# Patient Record
Sex: FEMALE | HISPANIC OR LATINO
[De-identification: names, ages, dates, MRNs, and addresses within clinical notes are randomized per-mention and may not be internally consistent; named-entity substitution may affect disease eponyms.]

---

## 2019-03-06 ENCOUNTER — APPOINTMENT (OUTPATIENT)
Dept: PULMONOLOGY | Facility: CLINIC | Age: 49
End: 2019-03-06
Payer: COMMERCIAL

## 2019-03-06 VITALS
BODY MASS INDEX: 33.23 KG/M2 | OXYGEN SATURATION: 97 % | WEIGHT: 176 LBS | DIASTOLIC BLOOD PRESSURE: 80 MMHG | RESPIRATION RATE: 25 BRPM | HEART RATE: 110 BPM | SYSTOLIC BLOOD PRESSURE: 130 MMHG | HEIGHT: 61 IN

## 2019-03-06 DIAGNOSIS — Z78.9 OTHER SPECIFIED HEALTH STATUS: ICD-10-CM

## 2019-03-06 DIAGNOSIS — Z86.79 PERSONAL HISTORY OF OTHER DISEASES OF THE CIRCULATORY SYSTEM: ICD-10-CM

## 2019-03-06 PROCEDURE — 99205 OFFICE O/P NEW HI 60 MIN: CPT | Mod: 25

## 2019-03-06 PROCEDURE — 94010 BREATHING CAPACITY TEST: CPT

## 2019-03-06 NOTE — PHYSICAL EXAM
[General Appearance - Well Developed] : well developed [Normal Appearance] : normal appearance [Well Groomed] : well groomed [General Appearance - Well Nourished] : well nourished [No Deformities] : no deformities [General Appearance - In No Acute Distress] : no acute distress [Normal Conjunctiva] : the conjunctiva exhibited no abnormalities [Eyelids - No Xanthelasma] : the eyelids demonstrated no xanthelasmas [Normal Oropharynx] : normal oropharynx [Neck Appearance] : the appearance of the neck was normal [Neck Cervical Mass (___cm)] : no neck mass was observed [Jugular Venous Distention Increased] : there was no jugular-venous distention [Thyroid Diffuse Enlargement] : the thyroid was not enlarged [Thyroid Nodule] : there were no palpable thyroid nodules [Respiration, Rhythm And Depth] : normal respiratory rhythm and effort [Exaggerated Use Of Accessory Muscles For Inspiration] : no accessory muscle use [Auscultation Breath Sounds / Voice Sounds] : lungs were clear to auscultation bilaterally [Abdomen Soft] : soft [Abdomen Tenderness] : non-tender [Abdomen Mass (___ Cm)] : no abdominal mass palpated [Abnormal Walk] : normal gait [Gait - Sufficient For Exercise Testing] : the gait was sufficient for exercise testing [Nail Clubbing] : no clubbing of the fingernails [Cyanosis, Localized] : no localized cyanosis [Petechial Hemorrhages (___cm)] : no petechial hemorrhages [Skin Color & Pigmentation] : normal skin color and pigmentation [Skin Turgor] : normal skin turgor [] : no rash [Deep Tendon Reflexes (DTR)] : deep tendon reflexes were 2+ and symmetric [Sensation] : the sensory exam was normal to light touch and pinprick [No Focal Deficits] : no focal deficits [Oriented To Time, Place, And Person] : oriented to person, place, and time [Impaired Insight] : insight and judgment were intact [Affect] : the affect was normal

## 2019-03-06 NOTE — ASSESSMENT
[FreeTextEntry1] : The patient is a very pleasant 48-year-old lady with rheumatoid arthritis on hydroxychloroquine and with a history of hypertension\par She gives a fairly good history of being exposed to a number of people with last upper respiratory infections and then coming down with similar symptoms and fever herself\par She was told that she had pneumonia when she went to the hospital after a syncopal attack treated with antibiotics\par A repeat chest x-ray done on February 28 confirms patchy infiltrates especially on the right\par I reviewed the films along with her and her \par \par Her physical exam is fairly unremarkable at this time\par \par I am recommending no further antibiotics I don't believe she needs oral corticosteroids\par I am suggesting a convalescent chest x-ray to be done in mid April and I would like to see her again after that has been accomplished\par \par If the patient has persistent infiltrates or symptoms, and then further investigation will be considered in view of her history of rheumatoid arthritis. Nevertheless this is most consistent with an acute illness and I am expecting a full improvement.\par \par In the absence of significant airways obstruction, I am not sure whether she will need her Symbicort for the long-term. However I have instructed her to utilize it twice daily for the next 3-4 weeks and if she improves in a significant amount she may discontinue this

## 2019-03-06 NOTE — HISTORY OF PRESENT ILLNESS
[FreeTextEntry1] : The patient is a 48-year-old lady referred by Dr. Glaser\par \par The patient has about a week and a half history of cough congestion originally presenting with a syncopal episode at 2 AM at home. She had had a day or 2 of fever prior. The patient was brought to the [place] emergency department where she was rehydrated and then discharged. She was told that she had pneumonia she was started on antibiotics\par \par The patient notes that during her family members illness many members of her family apparently had upper respiratory infections over the several weeks prior to this. She started coughing and was congested and she had fever the 2 days prior to her syncopal episode\par \par After going to the emergency department on February 22, she went to her primary doctor on February 28, was sent for a chest x-ray which showed bilateral patchy infiltrates, she was given medication including azithromycin and Ceftin.   She also was given Symbicort as well as cough medication\par \par The patient has underlying rheumatoid arthritis for which she takes  hydroxychloroquine\par \par The patient has hypertension for which he takes losartan and hydrochlorothiazide\par \par The patient is a nonsmoker. She works for [employer]. She is the mother of triplets who are 15. She has no history of asthma in the distant past

## 2019-03-06 NOTE — PROCEDURE
[FreeTextEntry1] : Spirometry was obtained the patient appears to have some restriction in her throat for capacity but no definite evidence of obstruction with a normal FEV1% and mid expiratory flow rates

## 2019-03-06 NOTE — CONSULT LETTER
[Dear  ___] : Dear  [unfilled], [FreeTextEntry1] : I had the pleasure of evaluating your patient, LOKI CORTEZ , in the office today.  Please review my consultation and evaluation report that follows below.  Please do not hesitate to call me if further information is necessary or if you wish to discuss ongoing care or diagnostic work-up.   \par I very much appreciate your referral and it is a privilege to be able to provide care for your patient.\par \par Sincerely,\par  \par Santiago Butler MD, MHCM, FACP\par Pulmonary Medicine\par  of Medicine\par [facility]\par \par [email or web address]\par Multi-Specialties at [place]\par \par

## 2019-04-16 ENCOUNTER — OUTPATIENT (OUTPATIENT)
Dept: OUTPATIENT SERVICES | Facility: HOSPITAL | Age: 49
LOS: 1 days | End: 2019-04-16
Payer: COMMERCIAL

## 2019-04-16 ENCOUNTER — APPOINTMENT (OUTPATIENT)
Dept: CT IMAGING | Facility: HOSPITAL | Age: 49
End: 2019-04-16

## 2019-04-16 ENCOUNTER — RESULT REVIEW (OUTPATIENT)
Age: 49
End: 2019-04-16

## 2019-04-16 ENCOUNTER — TRANSCRIPTION ENCOUNTER (OUTPATIENT)
Age: 49
End: 2019-04-16

## 2019-04-16 DIAGNOSIS — D47.3 ESSENTIAL (HEMORRHAGIC) THROMBOCYTHEMIA: ICD-10-CM

## 2019-04-16 DIAGNOSIS — Z00.00 ENCOUNTER FOR GENERAL ADULT MEDICAL EXAMINATION WITHOUT ABNORMAL FINDINGS: ICD-10-CM

## 2019-04-16 PROCEDURE — 77012 CT SCAN FOR NEEDLE BIOPSY: CPT | Mod: 26

## 2019-04-16 PROCEDURE — 20225 BONE BIOPSY TROCAR/NDL DEEP: CPT

## 2019-04-16 PROCEDURE — 88341 IMHCHEM/IMCYTCHM EA ADD ANTB: CPT

## 2019-04-16 PROCEDURE — 88271 CYTOGENETICS DNA PROBE: CPT

## 2019-04-16 PROCEDURE — 88275 CYTOGENETICS 100-300: CPT

## 2019-04-16 PROCEDURE — 85097 BONE MARROW INTERPRETATION: CPT

## 2019-04-16 PROCEDURE — 88237 TISSUE CULTURE BONE MARROW: CPT

## 2019-04-16 PROCEDURE — 88313 SPECIAL STAINS GROUP 2: CPT | Mod: 26

## 2019-04-16 PROCEDURE — 88305 TISSUE EXAM BY PATHOLOGIST: CPT | Mod: 26

## 2019-04-16 PROCEDURE — 88342 IMHCHEM/IMCYTCHM 1ST ANTB: CPT | Mod: 26,59

## 2019-04-16 PROCEDURE — 88341 IMHCHEM/IMCYTCHM EA ADD ANTB: CPT | Mod: 26,59

## 2019-04-16 PROCEDURE — 88189 FLOWCYTOMETRY/READ 16 & >: CPT

## 2019-04-16 PROCEDURE — 88360 TUMOR IMMUNOHISTOCHEM/MANUAL: CPT

## 2019-04-16 PROCEDURE — 88184 FLOWCYTOMETRY/ TC 1 MARKER: CPT

## 2019-04-16 PROCEDURE — 77012 CT SCAN FOR NEEDLE BIOPSY: CPT

## 2019-04-16 PROCEDURE — 88185 FLOWCYTOMETRY/TC ADD-ON: CPT

## 2019-04-16 PROCEDURE — 87205 SMEAR GRAM STAIN: CPT

## 2019-04-16 PROCEDURE — 88280 CHROMOSOME KARYOTYPE STUDY: CPT

## 2019-04-16 PROCEDURE — 88313 SPECIAL STAINS GROUP 2: CPT

## 2019-04-16 PROCEDURE — 88264 CHROMOSOME ANALYSIS 20-25: CPT

## 2019-04-16 PROCEDURE — 88291 CYTO/MOLECULAR REPORT: CPT

## 2019-04-16 PROCEDURE — 88342 IMHCHEM/IMCYTCHM 1ST ANTB: CPT

## 2019-04-16 PROCEDURE — 88360 TUMOR IMMUNOHISTOCHEM/MANUAL: CPT | Mod: 26

## 2019-04-16 PROCEDURE — 88305 TISSUE EXAM BY PATHOLOGIST: CPT

## 2019-04-17 ENCOUNTER — APPOINTMENT (OUTPATIENT)
Dept: PULMONOLOGY | Facility: CLINIC | Age: 49
End: 2019-04-17
Payer: COMMERCIAL

## 2019-04-17 VITALS
RESPIRATION RATE: 18 BRPM | OXYGEN SATURATION: 98 % | DIASTOLIC BLOOD PRESSURE: 80 MMHG | SYSTOLIC BLOOD PRESSURE: 120 MMHG | HEART RATE: 107 BPM

## 2019-04-17 LAB — TM INTERPRETATION: SIGNIFICANT CHANGE UP

## 2019-04-17 PROCEDURE — 99215 OFFICE O/P EST HI 40 MIN: CPT

## 2019-04-17 NOTE — PHYSICAL EXAM
[General Appearance - Well Developed] : well developed [Normal Appearance] : normal appearance [General Appearance - Well Nourished] : well nourished [Well Groomed] : well groomed [No Deformities] : no deformities [General Appearance - In No Acute Distress] : no acute distress [Eyelids - No Xanthelasma] : the eyelids demonstrated no xanthelasmas [Normal Conjunctiva] : the conjunctiva exhibited no abnormalities [Normal Oropharynx] : normal oropharynx [Neck Cervical Mass (___cm)] : no neck mass was observed [Neck Appearance] : the appearance of the neck was normal [Thyroid Nodule] : there were no palpable thyroid nodules [Jugular Venous Distention Increased] : there was no jugular-venous distention [Thyroid Diffuse Enlargement] : the thyroid was not enlarged [Respiration, Rhythm And Depth] : normal respiratory rhythm and effort [Exaggerated Use Of Accessory Muscles For Inspiration] : no accessory muscle use [Auscultation Breath Sounds / Voice Sounds] : lungs were clear to auscultation bilaterally [Abdomen Soft] : soft [Abdomen Tenderness] : non-tender [Abdomen Mass (___ Cm)] : no abdominal mass palpated [Gait - Sufficient For Exercise Testing] : the gait was sufficient for exercise testing [Abnormal Walk] : normal gait [Nail Clubbing] : no clubbing of the fingernails [Cyanosis, Localized] : no localized cyanosis [Petechial Hemorrhages (___cm)] : no petechial hemorrhages [] : no ischemic changes [Deep Tendon Reflexes (DTR)] : deep tendon reflexes were 2+ and symmetric [No Focal Deficits] : no focal deficits [Sensation] : the sensory exam was normal to light touch and pinprick [Oriented To Time, Place, And Person] : oriented to person, place, and time [Impaired Insight] : insight and judgment were intact [Affect] : the affect was normal

## 2019-04-17 NOTE — CONSULT LETTER
[FreeTextEntry1] : I had the pleasure of evaluating your patient, LOKI CORTEZ , in the office today.  Please review my consultation and evaluation report that follows below.  Please do not hesitate to call me if further information is necessary or if you wish to discuss ongoing care or diagnostic work-up.   \par I very much appreciate your referral and it is a privilege to be able to provide care for your patient.\par \par Sincerely,\par  \par Santiago Butler MD, MHCM, FACP\par Pulmonary Medicine\par  of Medicine\par [facility] at [facility] [place]/[facility]\par \par [email or web address]\par Multi-Specialties at [place]\par \par  [Dear  ___] : Dear  [unfilled],

## 2019-04-17 NOTE — HISTORY OF PRESENT ILLNESS
[FreeTextEntry1] : The patient is a 48-year-old lady with rheumatoid arthritis on hydroxychloroquine\par She has history of hypertension\par \par The patient had cough syncope after developing what appeared to be a pneumonia\par She had been exposed to a number of people in her family with upper respiratory infections\par \par The patient has not been on any other antibiotics or oral corticosteroids\par \par She is healing fairly well. She has much less cough but she still has some sputum production and is noted to be white\par She still has some exertional dyspnea when she walks steps\par \par However, prior to her acute illness, she had no difficulty with ambulation or shortness of breath. And as noted she had no history of asthma\par \par She has been using Symbicort some improvement\par \par The patient recently had a followup chest x-ray and I have the report but I cannot review the films\par Increased markings are noted at the bases but I also suspect that the patient could not take a deep breath

## 2019-04-17 NOTE — ASSESSMENT
[FreeTextEntry1] : The patient is a 48-year-old lady with rheumatoid arthritis on hydroxychloroquine and a history of hypertension on medication\par \par The patient appears to be recovering from an upper respiratory infection possibly complicated by pneumonitis\par She still has residual cough and some shortness of breath but she insists that she is definitely feeling better\par \par I do not think that she needs a CAT scan just yet. I would like to wait a little on her and repeat a chest x-ray in 4 weeks with good inspiratory effort\par \par I have also asked her to return here in 4 weeks for full pulmonary function testing and a reevaluation\par \par I don't believe any other medication is indicated at this time\par I am eager to see the results of her hematology workup as well

## 2019-04-29 LAB — CHROM ANALY OVERALL INTERP SPEC-IMP: SIGNIFICANT CHANGE UP

## 2019-05-01 LAB — CHROM ANALY INTERPHASE BLD FISH-IMP: SIGNIFICANT CHANGE UP

## 2019-05-15 ENCOUNTER — APPOINTMENT (OUTPATIENT)
Dept: PULMONOLOGY | Facility: CLINIC | Age: 49
End: 2019-05-15
Payer: COMMERCIAL

## 2019-05-15 VITALS
BODY MASS INDEX: 32.1 KG/M2 | WEIGHT: 170 LBS | HEART RATE: 97 BPM | SYSTOLIC BLOOD PRESSURE: 122 MMHG | HEIGHT: 61 IN | OXYGEN SATURATION: 97 % | DIASTOLIC BLOOD PRESSURE: 80 MMHG | RESPIRATION RATE: 18 BRPM

## 2019-05-15 DIAGNOSIS — Z00.00 ENCOUNTER FOR GENERAL ADULT MEDICAL EXAMINATION W/OUT ABNORMAL FINDINGS: ICD-10-CM

## 2019-05-15 DIAGNOSIS — J18.9 PNEUMONIA, UNSPECIFIED ORGANISM: ICD-10-CM

## 2019-05-15 PROCEDURE — 94729 DIFFUSING CAPACITY: CPT

## 2019-05-15 PROCEDURE — 94060 EVALUATION OF WHEEZING: CPT

## 2019-05-15 PROCEDURE — 94727 GAS DIL/WSHOT DETER LNG VOL: CPT

## 2019-05-15 PROCEDURE — 94664 DEMO&/EVAL PT USE INHALER: CPT | Mod: 59

## 2019-05-15 PROCEDURE — 99215 OFFICE O/P EST HI 40 MIN: CPT | Mod: 25

## 2019-05-15 NOTE — HISTORY OF PRESENT ILLNESS
[FreeTextEntry1] : The patient is a 48-year-old lady with rheumatoid arthritis treated with hydroxychloroquine\par She also has hypertension on losartan\par \par The patient initially presented after an upper respiratory infection complicated by syncope in February\par She had at least a right-sided infiltrate, possible right pleural effusion and was ill at the same time that many members of her family were sick\par \par Chest x-ray performed in February was reviewed which showed infiltrates and increased interstitial markings throughout both lungs.\par She has since had a repeat chest x-ray which still shows increased interstitial markings at both bases right greater than left. There was some improvement in the right midlung field\par \par The patient has been taking Symbicort and albuterol p.r.n.\par \par She remains with only minimal cough nonproductive, she does note shortness breath on exertion that she did not have before\par \par Review of old chart and films demonstrates that she has had CT of the chest in 2016 2017 which was consistent with interstitial lung disease, she had extensive groundglass infiltrates in both lungs she was referred to us by a pulmonologist but it is not clear why she was being evaluated\par \par The patient has rheumatoid arthritis and she was started on hydroxychloroquine in about 6 months ago due to arthritis of the hands\par \par She is followed by Dr. Meng [id number]\par \par The patient has seen a cardiologist Dr. Anders and had a recent echo the results of which I would like to see. However, an echo in February of this year showed no evidence of right or left ventricular dysfunction or enlargement. Pulmonary artery pressures could not be identified\par \par The patient has also been seen by Dr. Ballesteros, hematology, for probable MGUS and she recently had a bone marrow examination

## 2019-05-15 NOTE — ASSESSMENT
[FreeTextEntry1] : The patient is a 48-year-old lady with rheumatoid arthritis on hydroxy chloroquine and hypertension\par \par She appears to have interstitial lung disease of at least 3 years duration for uncertain etiology. I would like to order a repeat CT of the chest to evaluate her pulmonary parenchyma and compare\par It is possible that her interstitial findings are related to her rheumatoid arthritis\par Must discuss with the rheumatologist the current status of her rheumatoid treatment\par \par These findings are consistent with restrictive lung disease that was measured though it appears that her diffusion capacity is normal.\par \par Her acute illness in February was likely a viral syndrome superimposed upon her underlying pulmonary status.\par \par It is not clear if it is a significant element of obstruction. However in view of her better clinical status and her feeling that bronchodilators are helping, I would continue utilizing Symbicort and p.r.n. albuterol\par \par I would like to obtain the results of her most recent echocardiogram to specifically evaluate her right side pressures.\par \par I have asked the patient to return here within a month

## 2019-05-15 NOTE — PHYSICAL EXAM
[Normal Appearance] : normal appearance [General Appearance - Well Developed] : well developed [Well Groomed] : well groomed [General Appearance - Well Nourished] : well nourished [No Deformities] : no deformities [General Appearance - In No Acute Distress] : no acute distress [Normal Conjunctiva] : the conjunctiva exhibited no abnormalities [Eyelids - No Xanthelasma] : the eyelids demonstrated no xanthelasmas [Neck Appearance] : the appearance of the neck was normal [Normal Oropharynx] : normal oropharynx [Neck Cervical Mass (___cm)] : no neck mass was observed [Thyroid Diffuse Enlargement] : the thyroid was not enlarged [Jugular Venous Distention Increased] : there was no jugular-venous distention [Thyroid Nodule] : there were no palpable thyroid nodules [Respiration, Rhythm And Depth] : normal respiratory rhythm and effort [Exaggerated Use Of Accessory Muscles For Inspiration] : no accessory muscle use [FreeTextEntry1] : Faint crackles at the right and left bases were appreciated--not known before\par No wheezes [Abdomen Soft] : soft [Abdomen Tenderness] : non-tender [Abdomen Mass (___ Cm)] : no abdominal mass palpated [Gait - Sufficient For Exercise Testing] : the gait was sufficient for exercise testing [Abnormal Walk] : normal gait [Nail Clubbing] : no clubbing of the fingernails [Petechial Hemorrhages (___cm)] : no petechial hemorrhages [Cyanosis, Localized] : no localized cyanosis [Skin Color & Pigmentation] : normal skin color and pigmentation [] : no rash [No Venous Stasis] : no venous stasis [Skin Lesions] : no skin lesions [No Skin Ulcers] : no skin ulcer [No Xanthoma] : no  xanthoma was observed [Deep Tendon Reflexes (DTR)] : deep tendon reflexes were 2+ and symmetric [Sensation] : the sensory exam was normal to light touch and pinprick [No Focal Deficits] : no focal deficits [Impaired Insight] : insight and judgment were intact [Oriented To Time, Place, And Person] : oriented to person, place, and time [Affect] : the affect was normal

## 2019-05-15 NOTE — CONSULT LETTER
[Dear  ___] : Dear  [unfilled], [FreeTextEntry1] : I had the pleasure of evaluating your patient, LOKI CORTEZ , in the office today.  Please review my consultation and evaluation report that follows below.  Please do not hesitate to call me if further information is necessary or if you wish to discuss ongoing care or diagnostic work-up.   \par I very much appreciate your referral and it is a privilege to be able to provide care for your patient.\par \par Sincerely,\par  \par Santiago Butler MD, MHCM, FACP\par Pulmonary Medicine\par  of Medicine\par [facility] at [facility]\par \par [email or web address]\par Multi-Specialties at [place]\par \par  [DrMisha ___] : Dr. AVILA [DrMisha  ___] : Dr. AVILA

## 2019-05-15 NOTE — PROCEDURE
[FreeTextEntry1] : Full pulmonary functions were obtained today\par There is restriction of lung volumes with a FRC of 58% predicted and a total lung capacity of 50% predicted total lung capacity is 2.21\par Diffusion capacity corrected for lung volume is normal\par \par Spirometry does not demonstrate any obstruction

## 2019-06-05 ENCOUNTER — APPOINTMENT (OUTPATIENT)
Dept: PULMONOLOGY | Facility: CLINIC | Age: 49
End: 2019-06-05
Payer: COMMERCIAL

## 2019-06-05 VITALS
OXYGEN SATURATION: 98 % | TEMPERATURE: 98.5 F | SYSTOLIC BLOOD PRESSURE: 120 MMHG | HEART RATE: 100 BPM | DIASTOLIC BLOOD PRESSURE: 88 MMHG

## 2019-06-05 PROCEDURE — 99215 OFFICE O/P EST HI 40 MIN: CPT

## 2019-06-05 RX ORDER — BUDESONIDE AND FORMOTEROL FUMARATE DIHYDRATE 80; 4.5 UG/1; UG/1
80-4.5 AEROSOL RESPIRATORY (INHALATION) TWICE DAILY
Qty: 1 | Refills: 6 | Status: ACTIVE | COMMUNITY
Start: 2019-06-05 | End: 1900-01-01

## 2019-06-05 NOTE — PHYSICAL EXAM
[General Appearance - Well Developed] : well developed [Normal Appearance] : normal appearance [Well Groomed] : well groomed [General Appearance - Well Nourished] : well nourished [General Appearance - In No Acute Distress] : no acute distress [No Deformities] : no deformities [Eyelids - No Xanthelasma] : the eyelids demonstrated no xanthelasmas [Normal Conjunctiva] : the conjunctiva exhibited no abnormalities [Neck Appearance] : the appearance of the neck was normal [Normal Oropharynx] : normal oropharynx [Neck Cervical Mass (___cm)] : no neck mass was observed [Thyroid Diffuse Enlargement] : the thyroid was not enlarged [Jugular Venous Distention Increased] : there was no jugular-venous distention [Thyroid Nodule] : there were no palpable thyroid nodules [Respiration, Rhythm And Depth] : normal respiratory rhythm and effort [Exaggerated Use Of Accessory Muscles For Inspiration] : no accessory muscle use [Abdomen Tenderness] : non-tender [Abdomen Soft] : soft [Abnormal Walk] : normal gait [Abdomen Mass (___ Cm)] : no abdominal mass palpated [Gait - Sufficient For Exercise Testing] : the gait was sufficient for exercise testing [Cyanosis, Localized] : no localized cyanosis [Petechial Hemorrhages (___cm)] : no petechial hemorrhages [Nail Clubbing] : no clubbing of the fingernails [Skin Color & Pigmentation] : normal skin color and pigmentation [No Venous Stasis] : no venous stasis [] : no rash [No Skin Ulcers] : no skin ulcer [Skin Lesions] : no skin lesions [No Xanthoma] : no  xanthoma was observed [Deep Tendon Reflexes (DTR)] : deep tendon reflexes were 2+ and symmetric [Sensation] : the sensory exam was normal to light touch and pinprick [No Focal Deficits] : no focal deficits [Oriented To Time, Place, And Person] : oriented to person, place, and time [Affect] : the affect was normal [Impaired Insight] : insight and judgment were intact [FreeTextEntry1] : Faint crackles at the right and left bases were appreciated--not known before\par No wheezes

## 2019-06-05 NOTE — CONSULT LETTER
[Dear  ___] : Dear  [unfilled], [FreeTextEntry1] : I had the pleasure of evaluating your patient, LOKI CORTEZ , in the office today.  Please review my consultation and evaluation report that follows below.  Please do not hesitate to call me if further information is necessary or if you wish to discuss ongoing care or diagnostic work-up.   \par I very much appreciate your referral and it is a privilege to be able to provide care for your patient.\par \par Sincerely,\par  \par Santiago Butler MD, MHCM, FACP\par Pulmonary Medicine\par  of Medicine\par [facility] at \[facility]\""/[facility]\par \par [email or web address]\par Multi-Specialties at [place]\par \par  [DrMisha  ___] : Dr. AVILA [DrMisha ___] : Dr. AVILA

## 2019-06-05 NOTE — ASSESSMENT
[FreeTextEntry1] : The patient is a very pleasant 48-year-old lady mother of 15-year-old triplets with a history of rheumatoid arthritis\par She also has MGUS and history of hypertension\par She has been treated with Plaquenil for her rheumatoid arthritis although she has not been taking it regularly\par \par The patient has a CAT scan which demonstrates extensive bilateral interstitial increased markings, groundglass infiltrate, and some bronchiectatic changes which were present in almost as much intensity in December 2017\par \par Her pulmonary functions suggested a decrease in her lung volumes but I will have to repeat these due to question about the accuracy. I have asked the patient to obtain her old pulmonary function testing for comparison\par \par I strongly suspect that her pulmonary disease is related to her rheumatoid arthritis. I cannot say for sure whether her pulmonary status is truly worsening from a radiographic standpoint or a pulmonary function standpoint yet. She seems to have increased cough however that has persisted after her recent upper respiratory infection in February\par \par I have spoken to Dr. Meng and we agree that more intensive treatment of her rheumatoid arthritis appears to be indicated and he will see her again soon to arrange this\par \par I think it will be important to follow her CT scan and her pulmonary functions longitudinally\par Her cough may be another indicator as to whether she is improving or respond to increased RA treatment\par \par I will ask her to obtain sputum to look for evidence of atypical tuberculosis, but I am not concerned about any other possible intercurrent infection at this time\par \par I have asked the patient to return here next week for her repeat pulmonary functions

## 2019-06-05 NOTE — HISTORY OF PRESENT ILLNESS
[FreeTextEntry1] : This is a revisit of a 48-year-old lady with rheumatoid arthritis\par She has had persistent cough after a more significant upper respiratory infection in February\par She is taking her Symbicort regularly and she thinks it improves her cough but she rarely uses an emergency inhaler\par She has had no sputum production\par \par The patient did have a repeat CT of the chest which was reviewed along with her hand compared to the films from 2017\par \par She has bilateral patchy areas of ground glass appearance interstitial markings, some bronchiectatic appearing areas. These are largely the same as seen in December 2017 although I suspect they are a bit more intense at this time

## 2019-06-12 ENCOUNTER — APPOINTMENT (OUTPATIENT)
Dept: PULMONOLOGY | Facility: CLINIC | Age: 49
End: 2019-06-12
Payer: COMMERCIAL

## 2019-06-12 VITALS — WEIGHT: 172 LBS | BODY MASS INDEX: 32.47 KG/M2 | HEIGHT: 61 IN

## 2019-06-12 PROCEDURE — 94729 DIFFUSING CAPACITY: CPT

## 2019-06-12 PROCEDURE — 94727 GAS DIL/WSHOT DETER LNG VOL: CPT

## 2019-06-12 PROCEDURE — 94010 BREATHING CAPACITY TEST: CPT

## 2019-07-01 ENCOUNTER — APPOINTMENT (OUTPATIENT)
Dept: PULMONOLOGY | Facility: CLINIC | Age: 49
End: 2019-07-01
Payer: COMMERCIAL

## 2019-07-01 VITALS
OXYGEN SATURATION: 98 % | HEART RATE: 82 BPM | SYSTOLIC BLOOD PRESSURE: 126 MMHG | TEMPERATURE: 98.7 F | DIASTOLIC BLOOD PRESSURE: 86 MMHG

## 2019-07-01 PROCEDURE — 99215 OFFICE O/P EST HI 40 MIN: CPT

## 2019-07-01 NOTE — CONSULT LETTER
[Dear  ___] : Dear  [unfilled], [FreeTextEntry1] : I had the pleasure of evaluating your patient, LOKI CORTEZ , in the office today.  Please review my consultation and evaluation report that follows below.  Please do not hesitate to call me if further information is necessary or if you wish to discuss ongoing care or diagnostic work-up.   \par I very much appreciate your referral and it is a privilege to be able to provide care for your patient.\par \par Sincerely,\par  \par Santiago Butler MD, MHCM, FACP\par Pulmonary Medicine\par  of Medicine\par [facility] [employer] at [facility]\par \par [email or web address]\par Multi-Specialties at [place]\par \par  [DrMisha  ___] : Dr. AVILA

## 2019-07-01 NOTE — HISTORY OF PRESENT ILLNESS
[FreeTextEntry1] : The patient is a very pleasant 48-year-old lady with known rheumatoid arthritis\par She is here for review of her pulmonary functions which were redone\par \par Total lung capacity is decreased to 57% predicted and FRC is also reduced\par There is no diffusion capacity deficit\par \par She was seen recently by her rheumatologist and was started on leflunomide 200 mg as well as continuing hydroxychloroquine 200 mg a day\par \par She is taking Symbicort 2 puffs twice a day regularly\par Her cough her sputum have improved significantly

## 2019-07-01 NOTE — PHYSICAL EXAM
[General Appearance - Well Developed] : well developed [Normal Appearance] : normal appearance [Well Groomed] : well groomed [General Appearance - Well Nourished] : well nourished [No Deformities] : no deformities [General Appearance - In No Acute Distress] : no acute distress [Normal Conjunctiva] : the conjunctiva exhibited no abnormalities [Eyelids - No Xanthelasma] : the eyelids demonstrated no xanthelasmas [Normal Oropharynx] : normal oropharynx [Neck Appearance] : the appearance of the neck was normal [Neck Cervical Mass (___cm)] : no neck mass was observed [Jugular Venous Distention Increased] : there was no jugular-venous distention [Thyroid Diffuse Enlargement] : the thyroid was not enlarged [Thyroid Nodule] : there were no palpable thyroid nodules [Respiration, Rhythm And Depth] : normal respiratory rhythm and effort [Exaggerated Use Of Accessory Muscles For Inspiration] : no accessory muscle use [Abdomen Soft] : soft [Abdomen Tenderness] : non-tender [Abdomen Mass (___ Cm)] : no abdominal mass palpated [Abnormal Walk] : normal gait [Gait - Sufficient For Exercise Testing] : the gait was sufficient for exercise testing [Nail Clubbing] : no clubbing of the fingernails [Cyanosis, Localized] : no localized cyanosis [Petechial Hemorrhages (___cm)] : no petechial hemorrhages [Skin Color & Pigmentation] : normal skin color and pigmentation [] : no rash [No Venous Stasis] : no venous stasis [Skin Lesions] : no skin lesions [No Skin Ulcers] : no skin ulcer [No Xanthoma] : no  xanthoma was observed [Deep Tendon Reflexes (DTR)] : deep tendon reflexes were 2+ and symmetric [Sensation] : the sensory exam was normal to light touch and pinprick [No Focal Deficits] : no focal deficits [Oriented To Time, Place, And Person] : oriented to person, place, and time [Impaired Insight] : insight and judgment were intact [Affect] : the affect was normal [FreeTextEntry1] : Faint crackles at the right and left bases were appreciated--not known before\par No wheezes

## 2019-07-01 NOTE — PROCEDURE
[FreeTextEntry1] : Pulmonary functions were obtained on June 12\par There was no evidence of obstruction based on spirometry\par The patient had a decrease in her total lung capacity to 2.48 which was 57% predicted\par FRC was also decreased to 1.5 L which was 69% predicted\par There is no deficit for diffusion\par \par This is consistent with a restrictive lung disease

## 2019-07-01 NOTE — ASSESSMENT
[FreeTextEntry1] : The patient is a 48-year-old lady who was treated for cough and shortness of breath over the last number of months with antibiotics and bronchodilators\par \par She has known rheumatoid arthritis with joint involvement\par She was recently started on Leflunomide 200 mg and hydroxychloroquine 200 mg by her rheumatologist Dr. Meng\par \par Her CAT scan demonstrates interstitial infiltrates that are unchanged from 2017\par Her pulmonary functions demonstrate restrictive lung disease with a decrease in her total incapacity to 57% predicted\par \par These findings are most consistent with rheumatoid lung. It appears to be stable at this time\par It will be wise to follow these changes over time but I would not necessarily expect her pulmonary function to improve or interstitial findings to improve with treatment of her rheumatoid arthritis. The hope is that they will not progress\par \par The patient has improved cough and now and has no sputum production\par She seems to be doing very well with Symbicort. Although she fails to demonstrate any evidence of obstruction on pulmonary function testing, it is certainly likely that airways disease played some part in her increased cough and I would continue her on the Symbicort at this time. It is possible that she has asthmatic bronchitis, possibly related to the rheumatoid lung.\par In any event I think it is reasonable to maintain this medication for now\par \par I would like to see her again in the office in about 6 months and as noted would repeat her pulmonary function and CAT scan no earlier than a year for monitoring

## 2019-12-13 ENCOUNTER — APPOINTMENT (OUTPATIENT)
Dept: PULMONOLOGY | Facility: CLINIC | Age: 49
End: 2019-12-13

## 2020-01-06 ENCOUNTER — APPOINTMENT (OUTPATIENT)
Dept: PULMONOLOGY | Facility: CLINIC | Age: 50
End: 2020-01-06
Payer: COMMERCIAL

## 2020-01-06 VITALS
WEIGHT: 170 LBS | SYSTOLIC BLOOD PRESSURE: 125 MMHG | HEART RATE: 92 BPM | HEIGHT: 61 IN | OXYGEN SATURATION: 96 % | BODY MASS INDEX: 32.1 KG/M2 | DIASTOLIC BLOOD PRESSURE: 85 MMHG

## 2020-01-06 PROCEDURE — 99215 OFFICE O/P EST HI 40 MIN: CPT

## 2020-01-06 NOTE — HISTORY OF PRESENT ILLNESS
[FreeTextEntry1] : The patient is a 49-year-old lady with rheumatoid arthritis who I have been seeing for bronchitis and airways disease\par She has last been seen in July\par She was doing well on a regimen of Symbicort 2 puffs twice a day and p.r.n. use of albuterol\par \par She has known rheumatoid arthritis\par She appears to have restrictive lung disease as well as interstitial disease on her chest CT presumed secondary to rheumatoid arthritis\par \par Several weeks ago she developed cough and congestion at the same time that multiple members of her family developed an upper respiratory illness\par \par She was given antibiotics by Dr. Spencer\par  chest x-ray obtained on December 20 suggested increased markings compared to the earlier available films\par \par The patient appears to be doing much better at this time

## 2020-01-06 NOTE — ASSESSMENT
[FreeTextEntry1] : The patient is a 49-year-old woman with rheumatoid arthritis who has been treated with leflunomide and hydroxychloroquine for about 5-6 months\par \par Her rheumatoid arthritis appears to be fairly stable but she is seeing her rheumatologist again this week Dr Meng\par \par She had a recent upper respiratory infection probably viral, she received antibiotics, and she appears to be improving\par However a chest x-ray was reviewed in was apparently worse than suspected before\par \par The patient has known interstitial markings and rales on physical examination as well as previously documented restrictive disease\par This is much more likely related to her rheumatoid arthritis\par \par I would like to obtain another chest x-ray which is convalescent through her recent upper rest upper and lower respiratory infection\par Depending on the results, we may obtain a CT earlier than planned\par \par I am asking him to obtain new pulmonary functions for comparison in about one month\par I would like to see her after the chest x-ray in the pulmonary functions have been completed\par \par I also asked her to obtain an echocardiogram to evaluate for possible pulmonary hypertension\par \par I would be interested to know if it was felt that her rheumatoid arthritis is stable on the current regimen. It is not clear that her pulmonary status is worsening at this point

## 2020-01-06 NOTE — CONSULT LETTER
[FreeTextEntry1] : I had the pleasure of evaluating your patient, LOKI CORTEZ , in the office today.  Please review my consultation and evaluation report that follows below.  Please do not hesitate to call me if further information is necessary or if you wish to discuss ongoing care or diagnostic work-up.   \par I very much appreciate your referral and it is a privilege to be able to provide care for your patient.\par \par Sincerely,\par  \par Santiago Butler MD, MHCM, FACP\par Pulmonary Medicine\par  of Medicine\par [facility] at [facility]\par \par [email or web address]\par Multi-Specialties at [place]\par \par  [Dear  ___] : Dear  [unfilled], [DrMisha  ___] : Dr. AVILA

## 2020-01-27 ENCOUNTER — APPOINTMENT (OUTPATIENT)
Dept: PULMONOLOGY | Facility: CLINIC | Age: 50
End: 2020-01-27
Payer: COMMERCIAL

## 2020-01-27 VITALS — WEIGHT: 172 LBS | HEIGHT: 61.5 IN | BODY MASS INDEX: 32.06 KG/M2

## 2020-01-27 PROCEDURE — 94664 DEMO&/EVAL PT USE INHALER: CPT | Mod: NC,59

## 2020-01-27 PROCEDURE — 94727 GAS DIL/WSHOT DETER LNG VOL: CPT

## 2020-01-27 PROCEDURE — 85018 HEMOGLOBIN: CPT | Mod: QW

## 2020-01-27 PROCEDURE — 94729 DIFFUSING CAPACITY: CPT

## 2020-01-27 PROCEDURE — 94060 EVALUATION OF WHEEZING: CPT

## 2020-02-10 ENCOUNTER — APPOINTMENT (OUTPATIENT)
Dept: PULMONOLOGY | Facility: CLINIC | Age: 50
End: 2020-02-10
Payer: COMMERCIAL

## 2020-02-10 VITALS
HEIGHT: 61 IN | DIASTOLIC BLOOD PRESSURE: 101 MMHG | SYSTOLIC BLOOD PRESSURE: 157 MMHG | TEMPERATURE: 98.2 F | BODY MASS INDEX: 32.47 KG/M2 | HEART RATE: 92 BPM | OXYGEN SATURATION: 97 % | WEIGHT: 172 LBS

## 2020-02-10 PROCEDURE — 99215 OFFICE O/P EST HI 40 MIN: CPT

## 2020-02-10 NOTE — CONSULT LETTER
[Dear  ___] : Dear  [unfilled], [DrMisha  ___] : Dr. AVILA [DrMisha ___] : Dr. AVILA [FreeTextEntry1] : I had the pleasure of evaluating your patient, LOKI CORTEZ , in the office today.  Please review my consultation and evaluation report that follows below.  Please do not hesitate to call me if further information is necessary or if you wish to discuss ongoing care or diagnostic work-up.   \par I very much appreciate your referral and it is a privilege to be able to provide care for your patient.\par \par Sincerely,\par  \par Santiago Butler MD, MHCM, FACP\par Pulmonary Medicine\par  of Medicine\par [facility] [place]\par \par [email or web address]\par Multi-Specialties at [place]\par \par

## 2020-02-10 NOTE — ASSESSMENT
[FreeTextEntry1] : The patient is a 49-year-old lady with known rheumatoid arthritis on a regimen including hydroxychloroquine and leflunomide\par \par The patient has definite interstitial findings on her chest x-ray and CAT scan that appeared to be fairly stable dating back to 2016\par Her chest x-ray is stable compared to May 2019 but actually improved compared to February 2019\par \par There are no clinical signs of right heart failure but I would like to assess whether she has any evidence of pulmonary hypertension and I am awaiting the results of an echo\par \par I do not recommend any further intervention regarding her pulmonary disease at this time\par \par My impression is that we have never had any evidence of obstructive disease\par I believe she can stop the Symbicort (she is only taking it once a day anyway)\par I have told the patient to utilize her albuterol on a p.r.n. basis and we will see how she fares\par \par I am not sure that the inhaled steroids in Symbicort or having any efffects upon her lung or lung function\par \par I have asked the patient to return here in 3 months\par \par I am still curious to see whether there is evidence of active rheumatoid arthritis as determined by her rheumatologist

## 2020-02-10 NOTE — PROCEDURE
[FreeTextEntry1] : Pulmonary functions obtained January 27\par \par Total lung capacity was 2.5 L which represents a slight increase compared to earlier\par \par Flow volume loop FEV1 percent and mid expiratory flow do not suggest obstructive disease

## 2020-02-10 NOTE — HISTORY OF PRESENT ILLNESS
[TextBox_4] : The patient is a 49-year-old woman with known rheumatoid arthritis\par She is currently on hydroxychloroquine and leflunomide\par \par The patient has known interstitial lung disease, presumably from her rheumatoid arthritis\par \par Since last seen the patient has had a number of tests\par \par Pulmonary function demonstrated restriction with a total capacity of 2.5 which is actually slightly larger than previously obtained numbers. There was no evidence of obstruction again\par \par The patient had an echocardiogram at Dr. Anders office, results are pending\par \par The patient was sent for a repeat chest x-ray and this was reviewed along with the patient\par She has persistent interstitial infiltrates, mild, in both lungs in the appearance of the chest is stable compared to mid 2019.\par When compared to a chest x-ray from February 2019 air has been improvement in what were more intense interstitial infiltrates\par \par CAT scan of the chest was last obtained in May 2019 and compared to films from 2016\par The CAT scan demonstrates interstitial coarsening and scarring not substantially changed over a period of 3 years.\par \par The patient said she had blood work recently obtained by  her rheumatologist but I do not have this information\par \par The patient has been taking Symbicort but only once a day and she takes albuterol p.r.n.

## 2020-02-10 NOTE — PHYSICAL EXAM
[No Acute Distress] : no acute distress [Normal Oropharynx] : normal oropharynx [Normal Appearance] : normal appearance [No Neck Mass] : no neck mass [Normal Rate/Rhythm] : normal rate/rhythm [Normal S1, S2] : normal s1, s2 [No Resp Distress] : no resp distress [No Murmurs] : no murmurs [Clear to Auscultation Bilaterally] : clear to auscultation bilaterally [Benign] : benign [No Abnormalities] : no abnormalities [Normal Gait] : normal gait [No Clubbing] : no clubbing [No Cyanosis] : no cyanosis [No Edema] : no edema [FROM] : FROM [Normal Color/ Pigmentation] : normal color/ pigmentation [No Focal Deficits] : no focal deficits [Oriented x3] : oriented x3 [Normal Affect] : normal affect

## 2020-11-11 ENCOUNTER — APPOINTMENT (OUTPATIENT)
Dept: PULMONOLOGY | Facility: CLINIC | Age: 50
End: 2020-11-11
Payer: COMMERCIAL

## 2020-11-11 VITALS
WEIGHT: 180 LBS | HEART RATE: 114 BPM | HEIGHT: 61 IN | OXYGEN SATURATION: 97 % | DIASTOLIC BLOOD PRESSURE: 93 MMHG | SYSTOLIC BLOOD PRESSURE: 145 MMHG | BODY MASS INDEX: 33.99 KG/M2 | TEMPERATURE: 99.1 F

## 2020-11-11 PROCEDURE — 99072 ADDL SUPL MATRL&STAF TM PHE: CPT

## 2020-11-11 PROCEDURE — 99215 OFFICE O/P EST HI 40 MIN: CPT

## 2020-11-11 NOTE — HISTORY OF PRESENT ILLNESS
[TextBox_4] : The patient is a 50-year-old lady who was last seen in February\par \par She has underlying rheumatoid arthritis for which she takes hydroxychloroquine and leflunomide on a p.r.n. basis\par \par She has underlying interstitial lung disease which had been fairly stable\par Pulmonary functions had been obtained and were normal without any evidence of obstructive lung disease\par It was assumed that her interstitial lung disease was related to her rheumatoid arthritis\par \par She has been feeling better over the last month or so\par She started with diarrhea, then developed a cough productive of white sputum\par She had no fever but she was noted to have some shortness of breath on exertion\par She was given 2 courses of antibiotics without change in her status\par \par She has been taking her albuterol MDI or nebulizer 4-5 times a week with good effect but she has not been taking it more often than usual\par She certainly has not used it every day\par \par Recent evaluation showed leukocytosis of uncertain etiology\par \par Her diarrhea has improved\par Her cough does not appear to have improved however\par \par The patient's rheumatoid arthritis is usually manifested by joint pains which she does not appear to have at this time\par

## 2020-11-11 NOTE — ASSESSMENT
[FreeTextEntry1] : The patient is a 50-year-old woman with known rheumatoid arthritis that has been requiring only intermittent treatment\par She has been seen by \par The patient does not have active joint discomfort which is her usual complaint\par \par The patient also has interstitial lung disease\par It was assumed that this was related to rheumatoid arthritis but previous pulmonary functions showed no restriction and no obstruction\par \par Review of recent chest x-ray demonstrates no real change in radiographic appearance with diffuse increase in interstitial markings and more dense area in the right mid lung field medial\par \par I have compared these films to her previous ones\par Her last CAT scan however was done about a year and a half ago\par \par The etiology of her worsening cough is unclear at this time\par \par \par I am recommending\par \par Repeat CT with comparison to earlier films\par Repeat pulmonary function testing and I will see her after that has been completed in [place]\par The patient should have more lab work looking for evidence of active rheumatoid arthritis or other collagen vascular disease. The patient relates that she will be going to Dr. Meng next week who will draw blood as appropriate\par \par I have recommended that she continue utilizing albuterol p.r.n. but I would not restart Symbicort just yet\par I do not believe that there is any indication for repeat antibiotics at this time

## 2020-11-11 NOTE — CONSULT LETTER
[Dear  ___] : Dear  [unfilled], [FreeTextEntry1] : I had the pleasure of evaluating your patient, LOKI COTREZ , in the office today.  Please review my consultation and evaluation report that follows below.  Please do not hesitate to call me if further information is necessary or if you wish to discuss ongoing care or diagnostic work-up.   \par I very much appreciate your referral and it is a privilege to be able to provide care for your patient.\par \par Sincerely,\par  \par Santiago Butler MD, MHCM, FACP\par Pulmonary Medicine\par  of Medicine\par [facility] at [facility]\par \par [email or web address]\par Multi-Specialties at [place]\par \par  [DrMisha  ___] : Dr. AVILA

## 2021-01-07 DIAGNOSIS — Z01.818 ENCOUNTER FOR OTHER PREPROCEDURAL EXAMINATION: ICD-10-CM

## 2021-01-12 ENCOUNTER — APPOINTMENT (OUTPATIENT)
Dept: DISASTER EMERGENCY | Facility: CLINIC | Age: 51
End: 2021-01-12

## 2021-01-13 LAB — SARS-COV-2 N GENE NPH QL NAA+PROBE: NOT DETECTED

## 2021-02-02 ENCOUNTER — APPOINTMENT (OUTPATIENT)
Dept: DISASTER EMERGENCY | Facility: CLINIC | Age: 51
End: 2021-02-02

## 2021-02-03 LAB — SARS-COV-2 N GENE NPH QL NAA+PROBE: NOT DETECTED

## 2021-02-05 ENCOUNTER — APPOINTMENT (OUTPATIENT)
Dept: PULMONOLOGY | Facility: CLINIC | Age: 51
End: 2021-02-05

## 2021-02-05 ENCOUNTER — APPOINTMENT (OUTPATIENT)
Dept: PULMONOLOGY | Facility: CLINIC | Age: 51
End: 2021-02-05
Payer: COMMERCIAL

## 2021-02-05 VITALS
HEART RATE: 106 BPM | BODY MASS INDEX: 0.18 KG/M2 | OXYGEN SATURATION: 94 % | RESPIRATION RATE: 16 BRPM | WEIGHT: 1 LBS | SYSTOLIC BLOOD PRESSURE: 142 MMHG | HEIGHT: 62 IN | DIASTOLIC BLOOD PRESSURE: 82 MMHG

## 2021-02-05 VITALS — BODY MASS INDEX: 32.94 KG/M2 | HEIGHT: 62 IN | WEIGHT: 179 LBS | TEMPERATURE: 97.8 F

## 2021-02-05 DIAGNOSIS — J45.909 UNSPECIFIED ASTHMA, UNCOMPLICATED: ICD-10-CM

## 2021-02-05 DIAGNOSIS — R05 COUGH: ICD-10-CM

## 2021-02-05 PROCEDURE — 85018 HEMOGLOBIN: CPT | Mod: QW

## 2021-02-05 PROCEDURE — 94010 BREATHING CAPACITY TEST: CPT

## 2021-02-05 PROCEDURE — 99215 OFFICE O/P EST HI 40 MIN: CPT | Mod: 25

## 2021-02-05 PROCEDURE — 94729 DIFFUSING CAPACITY: CPT

## 2021-02-05 PROCEDURE — 94727 GAS DIL/WSHOT DETER LNG VOL: CPT

## 2021-02-05 PROCEDURE — 99072 ADDL SUPL MATRL&STAF TM PHE: CPT

## 2021-02-05 NOTE — PROCEDURE
[FreeTextEntry1] : PFTs performed and compared to previous ones\par TLC remains about 2.5 L which is unchanged\par No obstruction\par Normal DLCO when corrected for alveolar volume

## 2021-02-05 NOTE — CONSULT LETTER
[Dear  ___] : Dear  [unfilled], [DrMisha  ___] : Dr. AVILA [FreeTextEntry1] : I had the pleasure of evaluating your patient, LOKI CORTEZ , in the office today.  Please review my consultation and evaluation report that follows below.  Please do not hesitate to call me if further information is necessary or if you wish to discuss ongoing care or diagnostic work-up.   \par I very much appreciate your referral and it is a privilege to be able to provide care for your patient.\par \par Sincerely,\par  \par Santiago Butler MD, MHCM, FACP\par Pulmonary Medicine\par  of Medicine\par [facility] at [facility]\par \par [email or web address]\par Multi-Specialties at [place]\par \par

## 2021-02-05 NOTE — HISTORY OF PRESENT ILLNESS
[TextBox_4] : 51 yo lady, well know to me with interstitial lung disease and known RA on plaquinyl and leflunomide\par \par Last here in November\par Doing very well\par Had repeat CT chest  Nov 23 which shows no interval changes when compared to 2019\par \par PFTs were ordered for today\par \par Had blood work in November, ESR incr to 80.\par \par Had GI distress, diarrhea at that time which is improved\par Saw Hem for elev WBC but this had decreased

## 2021-02-05 NOTE — ASSESSMENT
[FreeTextEntry1] : 51 yo lady with Rheumatoid arthritis treated with Leflunomide and plaquinyl by Dr. 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 Meng\par F/u here in six months\par Repeat CT and PFTs in a year unless clinical indication for earlier intervention

## 2021-08-06 ENCOUNTER — APPOINTMENT (OUTPATIENT)
Dept: PULMONOLOGY | Facility: CLINIC | Age: 51
End: 2021-08-06
Payer: COMMERCIAL

## 2021-08-06 VITALS
SYSTOLIC BLOOD PRESSURE: 150 MMHG | BODY MASS INDEX: 32.92 KG/M2 | HEART RATE: 115 BPM | OXYGEN SATURATION: 96 % | DIASTOLIC BLOOD PRESSURE: 84 MMHG | WEIGHT: 180 LBS

## 2021-08-06 PROCEDURE — 99214 OFFICE O/P EST MOD 30 MIN: CPT

## 2021-08-06 NOTE — CONSULT LETTER
[Dear  ___] : Dear  [unfilled], [FreeTextEntry1] : I had the pleasure of evaluating your patient, LOKI CORTEZ , in the office today.  Please review my consultation and evaluation report that follows below.  Please do not hesitate to call me if further information is necessary or if you wish to discuss ongoing care or diagnostic work-up.   \par I very much appreciate your referral and it is a privilege to be able to provide care for your patient.\par \par Sincerely,\par  \par Santiago Butler MD, MHCM, FACP, CHUNG-C\par Pulmonary Medicine\par  of Medicine\par [facility] at [facility]\par [email or web address]\par \par [facility] in [place]\par [address]\[place]  [address]\par    Fax \par \par Multi-Specialties at [place]\par [address]\par [place], [place] \par \par  [DrMisha  ___] : Dr. AVILA

## 2021-08-06 NOTE — ASSESSMENT
[FreeTextEntry1] : Very pleasant 51 yo lady with longterm Rheumatoid ARTHRITIS on plaquinyl and leflunamide\par Restrictive lung disease on PFTs, last obtained in Feb--no changes\par \par Her last CT in Nov 2020 showed interstitial disease which was unchanged from 2019\par \par Would follow plan for repeat CT in Nov 2021 and revisit here\par Treatment continues for RA--patient to have new blood work by Dr Meng fairly soon\par \par No other intervention necessary for her pulmonary status at this time

## 2021-08-06 NOTE — HISTORY OF PRESENT ILLNESS
[TextBox_4] : 51 yo lady known to me with restrictive lung disease\par Hx of RA on plaquinyl and leflunomide and followed by Dr Meng\par \par Feeling quite good and she is active\par Patient notes cough and occasionally uses a nebulizer when she is congested or dyspneic\par \par Last seen by Dr Spencer in March

## 2021-10-27 ENCOUNTER — APPOINTMENT (OUTPATIENT)
Dept: PULMONOLOGY | Facility: CLINIC | Age: 51
End: 2021-10-27
Payer: COMMERCIAL

## 2021-10-27 VITALS
TEMPERATURE: 97 F | SYSTOLIC BLOOD PRESSURE: 161 MMHG | BODY MASS INDEX: 32.76 KG/M2 | HEIGHT: 62 IN | WEIGHT: 178 LBS | OXYGEN SATURATION: 95 % | DIASTOLIC BLOOD PRESSURE: 109 MMHG | HEART RATE: 92 BPM

## 2021-10-27 PROCEDURE — 99214 OFFICE O/P EST MOD 30 MIN: CPT

## 2021-10-27 NOTE — ASSESSMENT
[FreeTextEntry1] : 51 yo lady, works for [email or web address]\par Known RA on plaquinyl and leflunomide followed by Dr Meng\par Restrictive disease on PFT stable [id number]\par \par No clinical issues, occasional cough\par Able to work without difficulty\par Uses albuterol nebs occasionally to mobilize secretions but generally OK\par \par Repeat CT done reviewed shows persistent infiltrates especially periphery right lung but also on left\par No signficant change form 2020 and 2019\par ?Cardiomegaly tho not identified by radiologist\par \par \par Note recent admission to [facility] for diverticultis in August\par \par Imp  Restrictive disease with interstitial disease on CT in a patient with RA\par Stable at this time.\par Recommend cardiac reevaluation with Echocardiogram--patient to have results sent here\par Would maintain surveillance at this time, no intervention\par Return in 6 months\par \par

## 2021-10-27 NOTE — CONSULT LETTER
[Dear  ___] : Dear  [unfilled], [FreeTextEntry1] : I had the pleasure of evaluating your patient, LOKI CORTEZ , in the office today.  Please review my consultation and evaluation report that follows below.  Please do not hesitate to call me if further information is necessary or if you wish to discuss ongoing care or diagnostic work-up.   \par I very much appreciate your referral and it is a privilege to be able to provide care for your patient.\par \par Sincerely,\par  \par Santiago Butler MD, MHCM, FACP, CHUNG-C\par Pulmonary Medicine\par  of Medicine\par [facility] [employer] at [facility] [place]/[facility]\par [email or web address]\par \par [facility] in [place]\par [address]\[place]  [address]\par    Fax \par \par Multi-Specialties at [place]\par [address]\par [place], [place] \par \par

## 2021-10-27 NOTE — HISTORY OF PRESENT ILLNESS
[TextBox_4] : 51 yo lady, works for [employer]\par Known RA on plaquinyl and leflunomide followed by Dr Meng\par Restrictive disease on PFT stable [id number]\par \par No clinical issues, occasional cough\par Able to work without difficulty\par Uses albuterol nebs occasionally to mobilize secretions but generally OK\par \par Repeat CT done reviewed shows persistent infiltrates especially periphery right lung but also on left\par No signficant change form 2020 and 2019\par \par

## 2021-11-15 ENCOUNTER — APPOINTMENT (OUTPATIENT)
Dept: PULMONOLOGY | Facility: CLINIC | Age: 51
End: 2021-11-15

## 2023-05-30 ENCOUNTER — APPOINTMENT (OUTPATIENT)
Dept: PULMONOLOGY | Facility: CLINIC | Age: 53
End: 2023-05-30
Payer: COMMERCIAL

## 2023-05-30 VITALS
SYSTOLIC BLOOD PRESSURE: 164 MMHG | HEIGHT: 62 IN | TEMPERATURE: 96.9 F | DIASTOLIC BLOOD PRESSURE: 99 MMHG | OXYGEN SATURATION: 95 % | BODY MASS INDEX: 30.36 KG/M2 | WEIGHT: 165 LBS | HEART RATE: 98 BPM

## 2023-05-30 PROCEDURE — 99215 OFFICE O/P EST HI 40 MIN: CPT

## 2023-05-30 NOTE — CONSULT LETTER
[Dear  ___] : Dear  [unfilled], [DrMisha  ___] : Dr. AVILA [FreeTextEntry1] : I had the pleasure of evaluating your patient, LOKI CORTEZ , in the office today.  Please review my consultation and evaluation report that follows below.  Please do not hesitate to call me if further information is necessary or if you wish to discuss ongoing care or diagnostic work-up.   \par I very much appreciate your referral and it is a privilege to be able to provide care for your patient.\par \par Sincerely,\par  \par Santiago Butler MD, MHCM, FACP, CHUNG-C\par Pulmonary Medicine\par  of Medicine\par [facility] at [facility]\par [email or web address]\par \par [facility] in [place]\par [address]\[place]  [address]\par    Fax \par \par Multi-Specialties at [place]\par [address]\par [place], [place] \par \par

## 2023-05-30 NOTE — HISTORY OF PRESENT ILLNESS
[TextBox_4] : 53 yo woman with known rheumatoid arthritis and followed by Dr Meng\par She has been on leflunomide and plaquinyl for several years\par \par Previously noted restrictive pulmonary disease on PFTs as well as interstitial lung disease that has been stable \par up until last imaging in 2021\par She is working, but at a desk job\par Walking has become a problem for her, as when she goes to the store with her \par Upon questioning, she is definitely more fatigued over the course of the last year\par Also notes persistent cough, productive of only whitish sputum--the cough is present during day and sometimes during the evening\par Only taking albuterol inhaler occasionally\par She says that she had an ECHO last year that was 'normal' but I do not have the information\par \par Last PFTs 2/21\par TLC 2.48 L  54% pred   FRC 1.46 L 55% pred\par DLCO 82%, no obstruction\par \par The patient was exercised with the office\par 02sat RA rest 95% HR 85\par 02 sat RA exercise about 200 feet--decreased sat to 82%   with progressive \par  improvement with rest over several minutes\par 02 sat Nasal oxygen at 2 LPM with exercise--remained 91%\par

## 2023-05-30 NOTE — PHYSICAL EXAM
[No Acute Distress] : no acute distress [Normal Oropharynx] : normal oropharynx [Normal Appearance] : normal appearance [No Neck Mass] : no neck mass [Normal Rate/Rhythm] : normal rate/rhythm [Normal S1, S2] : normal s1, s2 [No Murmurs] : no murmurs [No Resp Distress] : no resp distress [No Abnormalities] : no abnormalities [Benign] : benign [Normal Gait] : normal gait [No Clubbing] : no clubbing [No Cyanosis] : no cyanosis [No Edema] : no edema [FROM] : FROM [Normal Color/ Pigmentation] : normal color/ pigmentation [No Focal Deficits] : no focal deficits [Oriented x3] : oriented x3 [Normal Affect] : normal affect [TextBox_68] : Bilateral lower lobe soft rales noted

## 2023-05-30 NOTE — ASSESSMENT
[FreeTextEntry1] : 51 yo woman with known rheumatoid arthritis and followed by Dr Yusra Winters \par She has been on leflunomide and plaquinyl for several years\par \par Previously noted restrictive pulmonary disease on PFTs as well as interstitial lung disease that has been stable \par up until last imaging in 2021\par She is working, but at a desk job\par Walking has become a problem for her, as when she goes to the store with her \par Upon questioning, she is definitely more fatigued over the course of the last year\par Also notes persistent cough, productive of only whitish sputum--the cough is present during day and sometimes during the evening\par Only taking albuterol inhaler occasionally\par She says that she had an ECHO last year that was 'normal' but I do not have the information\par \par Last PFTs 2/21\par TLC 2.48 L  54% pred   FRC 1.46 L 55% pred\par DLCO 82%, no obstruction\par \par The patient was exercised with the office\par 02sat RA rest 95% HR 85\par 02 sat RA exercise about 200 feet--decreased sat to 82%   with progressive \par  improvement with rest over several minutes\par 02 sat Nasal oxygen at 2 LPM with exercise--remained 91%\par \par Imp \par 51 yo woman with RA on Leflunomide and hydroxychloroquine\par Previously known restriction and interstitial fibrosis on CT\par She appears to be more dyspneic with exertion progressively over last year\par Newly noted exercise hypoxemia--will begin exercise oxygen with portable system\par \par Will repeat PFTs and CT chest, obtain current echo to r/o pulmonary arterial hypertension\par Concerned about progression of interstitial lung disease, restriction and new exercise hypoxemia\par She may be a candidate for other treatment for her Rheumatoid arthritis\par Might consider antifibrotic treatment as well\par \par Note that patient wishes to continue to work but she must continue at a desk job. \par Encouraged to f/u soon with cardiology for ECHO and repeat evaluation by Dr Meng\par \par Addendum:\par Spoke to Dr Meng who will see patient in several weeks and will consider alternate treatment of RA pulmonary fibrosis\par

## 2023-06-30 ENCOUNTER — APPOINTMENT (OUTPATIENT)
Dept: PULMONOLOGY | Facility: CLINIC | Age: 53
End: 2023-06-30

## 2023-07-03 ENCOUNTER — APPOINTMENT (OUTPATIENT)
Dept: PULMONOLOGY | Facility: CLINIC | Age: 53
End: 2023-07-03

## 2023-07-10 ENCOUNTER — APPOINTMENT (OUTPATIENT)
Dept: PULMONOLOGY | Facility: CLINIC | Age: 53
End: 2023-07-10
Payer: COMMERCIAL

## 2023-07-10 VITALS — BODY MASS INDEX: 31.13 KG/M2 | HEIGHT: 61.5 IN | WEIGHT: 167 LBS

## 2023-07-10 VITALS
SYSTOLIC BLOOD PRESSURE: 142 MMHG | DIASTOLIC BLOOD PRESSURE: 82 MMHG | HEART RATE: 84 BPM | RESPIRATION RATE: 16 BRPM | OXYGEN SATURATION: 96 %

## 2023-07-10 DIAGNOSIS — M06.9 RHEUMATOID ARTHRITIS, UNSPECIFIED: ICD-10-CM

## 2023-07-10 PROCEDURE — 94727 GAS DIL/WSHOT DETER LNG VOL: CPT

## 2023-07-10 PROCEDURE — 94729 DIFFUSING CAPACITY: CPT

## 2023-07-10 PROCEDURE — 85018 HEMOGLOBIN: CPT | Mod: QW

## 2023-07-10 PROCEDURE — 94010 BREATHING CAPACITY TEST: CPT

## 2023-07-10 PROCEDURE — 99215 OFFICE O/P EST HI 40 MIN: CPT | Mod: 25

## 2023-07-10 RX ORDER — BISOPROLOL FUMARATE 5 MG/1
5 TABLET, FILM COATED ORAL
Refills: 0 | Status: ACTIVE | COMMUNITY

## 2023-07-10 NOTE — HISTORY OF PRESENT ILLNESS
[TextBox_4] : 53 yo woman with known rheumatoid arthritis and followed by Dr Meng\par She has been on leflunomide and plaquinyl for several years\par Previously noted restrictive pulmonary disease on PFTs as well as interstitial lung disease that has been stable \par up until last imaging in 2021\par \par In May 23, noted to have exercise hypoxemia in the office\par She has used her oxygen when she is cleaning her house but rarely otherwise\par She goes to work without oxygen and she sleeps without oxygen\par Monitors her pulse ox--she is 96% on RA now at rest\par \par In general she feels fine\par \par Since last seen--went to cardiology and had ECHO\par Reviewed results: Normal RV and normal PA pressures noted on 6/19/23\par \par CT done at [facility] on June 6 23:\par STABLE interstitial fibrosis and honeycombing compared to 10/2021\par There is enlargement of pretracheal node from 1.7 to 2.6 cm and a subcarinal node from 4.5x1.3 to 4.9x2 cm\par

## 2023-07-10 NOTE — PROCEDURE
[FreeTextEntry1] : PFT\par No evidence of obstruction\par TLC 2.27 51 % pred down from 2.48 last year but has been this low before\par DLCO decr to 78% predicted--previously normal

## 2023-07-10 NOTE — ASSESSMENT
[FreeTextEntry1] : 51 yo woman with known rheumatoid arthritis and followed by Dr Meng\par She has been on leflunomide and plaquinyl for several years\par Previously noted restrictive pulmonary disease on PFTs as well as interstitial lung disease that has been stable \par up until last imaging in 2021\par In May 23, noted to have exercise hypoxemia in the office\par She has used her oxygen when she is cleaning her house but rarely otherwise\par She goes to work without oxygen and she sleeps without oxygen\par Monitors her pulse ox--she is 96% on RA now at rest\par In general she feels fine\par Since last seen--went to cardiology and had ECHO\par Reviewed results: Normal RV and normal PA pressures noted on 6/19/23\par CT done at [facility] on June 6 23:\par STABLE interstitial fibrosis and honeycombing compared to 10/2021\par There is enlargement of pretracheal node from 1.7 to 2.6 cm and a subcarinal node from 4.5x1.3 to 4.9x2 cm\par \par PFT\par No evidence of obstruction\par TLC 2.27 51 % pred down from 2.48 last year but has been this low before\par DLCO decreased  to 78% predicted--previously normal\par \par Imp \par 51 yo woman with RA on leflunomide and plaquinyl\par Interstitial lung disease , apparently stable on CT of last several years\par PFTs show restriction but also this has been relatively stable\par The DLCO appears slightly decreased compared to historical findings\par NEW development of exercise hypoxemia, treated PRN with oxygen\par No suggestion of DVT\par \par NO evidence of PA pressure elevation, PAH. Pulmonary embolic disease is not suspected\par Recently started on beta blocker by cardiology\par \par There does appear to be further slow enlargement of her mediastinal adenopathy altho this has been noted for at least 6 years\par The significance of this is unknown but may be related to her RA\par In view of young age tho, still concerned about neoplasia despite the indolent course.\par Will obtain PET scan and see again after that has been accomplished\par \par The patient will see Dr Meng soon who will consider other interventions for her RA\par \par \par

## 2023-07-10 NOTE — CONSULT LETTER
[Dear  ___] : Dear  [unfilled], [DrMisha  ___] : Dr. AVILA [FreeTextEntry1] : I had the pleasure of evaluating your patient, LOKI CORTEZ , in the office today.  Please review my consultation and evaluation report that follows below.  Please do not hesitate to call me if further information is necessary or if you wish to discuss ongoing care or diagnostic work-up.   \par I very much appreciate your referral and it is a privilege to be able to provide care for your patient.\par \par Sincerely,\par  \par Santiago Butler MD, MHCM, FACP, CHUNG-C\par Pulmonary Medicine\par  of Medicine\par [employer] [facility] [employer] at [facility]\par [email or web address]\par \par [facility] in [place]\par [address]\[place]  [address]\par    Fax \par \par [facility] [place]\par [address]\par [place], [place] \par \par

## 2023-07-19 ENCOUNTER — APPOINTMENT (OUTPATIENT)
Dept: NUCLEAR MEDICINE | Facility: CLINIC | Age: 53
End: 2023-07-19
Payer: COMMERCIAL

## 2023-07-19 ENCOUNTER — OUTPATIENT (OUTPATIENT)
Dept: OUTPATIENT SERVICES | Facility: HOSPITAL | Age: 53
LOS: 1 days | End: 2023-07-19

## 2023-07-19 DIAGNOSIS — R59.0 LOCALIZED ENLARGED LYMPH NODES: ICD-10-CM

## 2023-07-19 PROCEDURE — 78815 PET IMAGE W/CT SKULL-THIGH: CPT | Mod: 26,PI

## 2023-07-31 ENCOUNTER — APPOINTMENT (OUTPATIENT)
Dept: PULMONOLOGY | Facility: CLINIC | Age: 53
End: 2023-07-31
Payer: COMMERCIAL

## 2023-07-31 VITALS
WEIGHT: 168 LBS | BODY MASS INDEX: 31.31 KG/M2 | HEIGHT: 61.5 IN | DIASTOLIC BLOOD PRESSURE: 78 MMHG | SYSTOLIC BLOOD PRESSURE: 122 MMHG | OXYGEN SATURATION: 96 % | RESPIRATION RATE: 16 BRPM | HEART RATE: 63 BPM

## 2023-07-31 VITALS — WEIGHT: 168 LBS | BODY MASS INDEX: 31.31 KG/M2 | HEIGHT: 61.5 IN

## 2023-07-31 DIAGNOSIS — J84.9 INTERSTITIAL PULMONARY DISEASE, UNSPECIFIED: ICD-10-CM

## 2023-07-31 DIAGNOSIS — R59.0 LOCALIZED ENLARGED LYMPH NODES: ICD-10-CM

## 2023-07-31 DIAGNOSIS — R09.02 HYPOXEMIA: ICD-10-CM

## 2023-07-31 PROCEDURE — 99215 OFFICE O/P EST HI 40 MIN: CPT

## 2023-08-01 NOTE — CONSULT LETTER
[Dear  ___] : Dear  [unfilled], [FreeTextEntry1] : I had the pleasure of evaluating your patient, LOKI CORTEZ , in the office today.  Please review my consultation and evaluation report that follows below.  Please do not hesitate to call me if further information is necessary or if you wish to discuss ongoing care or diagnostic work-up.    I very much appreciate your referral and it is a privilege to be able to provide care for your patient.  Sincerely,   Santiago Butler MD, MHCM, FACP, CHUNG-C Pulmonary Medicine  of Medicine [facility] [employer] [facility] at \[facility]\""/[facility] [email or web address] in [address] [place]  [address]    Fax   [facility] at [address]  [phone]

## 2023-08-01 NOTE — HISTORY OF PRESENT ILLNESS
[TextBox_4] : 51 yo woman with RA on leflunomide and plaquinyl Interstitial lung disease , apparently stable on CT of last several years PFTs show restriction but also this has been relatively stable The DLCO appears slightly decreased compared to historical findings NEW development of exercise hypoxemia, treated PRN with oxygen No suggestion of DVT onphysical exam NO evidence of PA pressure elevation, PAH. d Recently started on beta blocker by cardiology  PET CT was obtained to evaluate the slowly enlarging mediastinal nodes This was reviewed with the patient and  No signficant mediastinal uptake was identified However, there was uptake in the right inguinal region  Patient has NOT noted any local or RLE infections to account for adenopathy

## 2023-08-01 NOTE — ASSESSMENT
[FreeTextEntry1] : 53 yo woman with RA on leflunomide and plaquinyl Interstitial lung disease , apparently stable on CT of last several years PFTs show restriction but also this has been relatively stable The DLCO appears slightly decreased compared to historical findings NEW development of exercise hypoxemia, treated PRN with oxygen No suggestion of DVT onphysical exam NO evidence of PA pressure elevation, PAH. d Recently started on beta blocker by cardiology  PET CT was obtained to evaluate the slowly enlarging mediastinal nodes This was reviewed with the patient and  No signficant mediastinal uptake was identified However, there was uptake in the right inguinal region  Patient has NOT noted any local or RLE infections to account for adenopathy.  Imp 53 yo woman with RA on leflunomide and plaquinyl Stable interstitial lung disease and PET negative mediastinal nodes, altho somewhat larger Stable PFTs showing restriction and maybe decr in DLCO On balance , it does not appear that her RA is causing worsened pulmonary disease Altho ECHO is normal, we have not completely ruled out the possibility of TBE so will order CTA  Spoke by Telephone with Dr Meng in presence of patient We agree that for now, an escalation of treatment for RA is not required though will plan repeat CT chest and PFTs in six months Concern about Right inguinal +nodes--will refer to GYN to consider evaluation and possible biopsy Patient will find her own gynecologist

## 2023-11-06 ENCOUNTER — APPOINTMENT (OUTPATIENT)
Dept: PULMONOLOGY | Facility: CLINIC | Age: 53
End: 2023-11-06

## 2025-06-23 ENCOUNTER — NON-APPOINTMENT (OUTPATIENT)
Age: 55
End: 2025-06-23

## 2025-06-24 ENCOUNTER — APPOINTMENT (OUTPATIENT)
Dept: PULMONOLOGY | Facility: CLINIC | Age: 55
End: 2025-06-24
Payer: COMMERCIAL

## 2025-06-24 VITALS
SYSTOLIC BLOOD PRESSURE: 144 MMHG | OXYGEN SATURATION: 96 % | TEMPERATURE: 97.9 F | HEIGHT: 61.5 IN | BODY MASS INDEX: 31.31 KG/M2 | WEIGHT: 168 LBS | HEART RATE: 81 BPM | DIASTOLIC BLOOD PRESSURE: 91 MMHG

## 2025-06-24 PROCEDURE — 99215 OFFICE O/P EST HI 40 MIN: CPT

## 2025-07-21 ENCOUNTER — NON-APPOINTMENT (OUTPATIENT)
Age: 55
End: 2025-07-21

## 2025-08-04 ENCOUNTER — APPOINTMENT (OUTPATIENT)
Dept: PULMONOLOGY | Facility: CLINIC | Age: 55
End: 2025-08-04
Payer: COMMERCIAL

## 2025-08-04 VITALS
HEART RATE: 96 BPM | OXYGEN SATURATION: 95 % | DIASTOLIC BLOOD PRESSURE: 80 MMHG | RESPIRATION RATE: 16 BRPM | SYSTOLIC BLOOD PRESSURE: 132 MMHG

## 2025-08-04 DIAGNOSIS — R09.02 HYPOXEMIA: ICD-10-CM

## 2025-08-04 DIAGNOSIS — J84.9 INTERSTITIAL PULMONARY DISEASE, UNSPECIFIED: ICD-10-CM

## 2025-08-04 DIAGNOSIS — J45.909 UNSPECIFIED ASTHMA, UNCOMPLICATED: ICD-10-CM

## 2025-08-04 DIAGNOSIS — M06.9 RHEUMATOID ARTHRITIS, UNSPECIFIED: ICD-10-CM

## 2025-08-04 DIAGNOSIS — R59.0 LOCALIZED ENLARGED LYMPH NODES: ICD-10-CM

## 2025-08-04 PROCEDURE — 99215 OFFICE O/P EST HI 40 MIN: CPT | Mod: 25

## 2025-08-04 PROCEDURE — 94729 DIFFUSING CAPACITY: CPT

## 2025-08-04 PROCEDURE — 94010 BREATHING CAPACITY TEST: CPT

## 2025-08-04 PROCEDURE — 94727 GAS DIL/WSHOT DETER LNG VOL: CPT

## 2025-08-04 PROCEDURE — 85018 HEMOGLOBIN: CPT | Mod: QW
